# Patient Record
Sex: FEMALE | Race: OTHER | Employment: FULL TIME | URBAN - NONMETROPOLITAN AREA
[De-identification: names, ages, dates, MRNs, and addresses within clinical notes are randomized per-mention and may not be internally consistent; named-entity substitution may affect disease eponyms.]

---

## 2021-04-29 ENCOUNTER — APPOINTMENT (OUTPATIENT)
Dept: GENERAL RADIOLOGY | Age: 21
End: 2021-04-29

## 2021-04-29 ENCOUNTER — HOSPITAL ENCOUNTER (EMERGENCY)
Age: 21
Discharge: HOME | End: 2021-04-29
Attending: INTERNAL MEDICINE

## 2021-04-29 VITALS
SYSTOLIC BLOOD PRESSURE: 136 MMHG | TEMPERATURE: 98.4 F | RESPIRATION RATE: 18 BRPM | HEART RATE: 103 BPM | DIASTOLIC BLOOD PRESSURE: 91 MMHG | OXYGEN SATURATION: 100 %

## 2021-04-29 DIAGNOSIS — M54.31 SCIATICA OF RIGHT SIDE: ICD-10-CM

## 2021-04-29 DIAGNOSIS — S39.012A BACK STRAIN, INITIAL ENCOUNTER: Primary | ICD-10-CM

## 2021-04-29 PROCEDURE — 6370000000 HC RX 637 (ALT 250 FOR IP): Performed by: INTERNAL MEDICINE

## 2021-04-29 PROCEDURE — 6360000002 HC RX W HCPCS: Performed by: INTERNAL MEDICINE

## 2021-04-29 PROCEDURE — 99284 EMERGENCY DEPT VISIT MOD MDM: CPT

## 2021-04-29 PROCEDURE — 96372 THER/PROPH/DIAG INJ SC/IM: CPT

## 2021-04-29 PROCEDURE — 72110 X-RAY EXAM L-2 SPINE 4/>VWS: CPT

## 2021-04-29 RX ORDER — KETOROLAC TROMETHAMINE 10 MG/1
10 TABLET, FILM COATED ORAL EVERY 6 HOURS PRN
Qty: 20 TABLET | Refills: 0 | Status: SHIPPED | OUTPATIENT
Start: 2021-04-29 | End: 2022-02-09

## 2021-04-29 RX ORDER — KETOROLAC TROMETHAMINE 30 MG/ML
60 INJECTION, SOLUTION INTRAMUSCULAR; INTRAVENOUS ONCE
Status: COMPLETED | OUTPATIENT
Start: 2021-04-29 | End: 2021-04-29

## 2021-04-29 RX ORDER — CYCLOBENZAPRINE HCL 10 MG
10 TABLET ORAL ONCE
Status: COMPLETED | OUTPATIENT
Start: 2021-04-29 | End: 2021-04-29

## 2021-04-29 RX ORDER — PREDNISONE 20 MG/1
40 TABLET ORAL DAILY
Qty: 10 TABLET | Refills: 0 | Status: SHIPPED | OUTPATIENT
Start: 2021-04-29 | End: 2021-05-04

## 2021-04-29 RX ORDER — METHYLPREDNISOLONE SODIUM SUCCINATE 125 MG/2ML
125 INJECTION, POWDER, LYOPHILIZED, FOR SOLUTION INTRAMUSCULAR; INTRAVENOUS ONCE
Status: COMPLETED | OUTPATIENT
Start: 2021-04-29 | End: 2021-04-29

## 2021-04-29 RX ORDER — CYCLOBENZAPRINE HCL 10 MG
10 TABLET ORAL 2 TIMES DAILY PRN
Qty: 30 TABLET | Refills: 0 | Status: SHIPPED | OUTPATIENT
Start: 2021-04-29 | End: 2021-05-09

## 2021-04-29 RX ADMIN — KETOROLAC TROMETHAMINE 60 MG: 30 INJECTION, SOLUTION INTRAMUSCULAR at 12:05

## 2021-04-29 RX ADMIN — METHYLPREDNISOLONE SODIUM SUCCINATE 125 MG: 125 INJECTION, POWDER, FOR SOLUTION INTRAMUSCULAR; INTRAVENOUS at 12:05

## 2021-04-29 RX ADMIN — CYCLOBENZAPRINE HYDROCHLORIDE 10 MG: 10 TABLET, FILM COATED ORAL at 12:05

## 2021-04-29 ASSESSMENT — PAIN DESCRIPTION - ORIENTATION: ORIENTATION: RIGHT

## 2021-04-29 ASSESSMENT — PAIN DESCRIPTION - PAIN TYPE: TYPE: ACUTE PAIN

## 2021-04-29 ASSESSMENT — PAIN DESCRIPTION - LOCATION: LOCATION: BACK;LEG;HIP

## 2021-04-29 NOTE — ED NOTES
Pt refused urine drug screen and states is not going to fill out [facility] paperwork.       Vamsi Blunt RN  04/29/21 3992

## 2021-04-29 NOTE — ED NOTES
Patient calls out stating she can provide urine specimen for urine drug screen required by Lilia. After 5 minutes in the bathroom patient unable to provide urine specimen. Patient has water and is encouraged to call when she can try again.       Hetal Lindo RN  04/29/21 1257

## 2021-04-29 NOTE — ED NOTES
Stephanie Holder, Director of Nursing at [facility], was notified that this patient has refused to provide specimen for urine drug screen.       Rose Gonzalez RN  04/29/21 6930

## 2021-04-29 NOTE — ED PROVIDER NOTES
[facility] ED  EMERGENCY DEPARTMENT ENCOUNTER      Pt Name: Yoandy German  MRN: [id number]  [place] 2000  Date of evaluation: 4/29/2021  Provider: Seda Calles MD    CHIEF COMPLAINT       Chief Complaint   Patient presents with    Back Pain     Pt was walking a resident and the resident fell backwards landing on her right thigh. Pt has hx of sciatica and now has severe pain to her right lower back and down right leg. HISTORY OF PRESENT ILLNESS   (Location/Symptom, Timing/Onset, Context/Setting, Quality, Duration, Modifying Factors, Severity)  Note limiting factors. Yoandy German is a 23 y.o. 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 She did not fall back but continued to support the resident so she would not fall and felt pain in her lower back which radiates to her right gluteus and down her leg. She denies loss or bowel control. There is no weakness in her legs. However she does state that trying to elevate her leg causes pain in her low back. She has not tried thing for her symptoms. She has not been seen by any other providers for it. This patient is being evaluated during the COVID-19 pandemic. HPI    Nursing Notes were reviewed. REVIEW OF SYSTEMS    (2-9 systems for level 4, 10 or more for level 5)       REVIEW OF SYSTEMS    Constitutional: Negative for fatigue and fever. Genitourinary: Negative for difficulty urinating, dysuria, hematuria and urgency. Musculoskeletal: Positive for low back pain radiating to her right gluteus and leg, negative for arthralgias, back pain and neck pain. Neurological: Negative for loss of urine or bowel control, head injury, loss of consciousness, dizziness, speech difficulty, weakness, distal tingling/numbness and headaches.      All other systems reviewed and are negative. Except as noted above the remainder of the review of systems was reviewed and negative. PASTMEDICAL HISTORY   History reviewed. No pertinent past medical history. SURGICAL HISTORY     History reviewed. No pertinent surgical history. CURRENT MEDICATIONS       Discharge Medication List as of 4/29/2021 12:44 PM          ALLERGIES     Amoxicillin    FAMILY HISTORY     History reviewed. No pertinent family history.        SOCIAL HISTORY       Social History     Socioeconomic History    Marital status: Single     Spouse name: None    Number of children: None    Years of education: None    Highest education level: None   Occupational History    None   Social Needs    Financial resource strain: None    Food insecurity     Worry: None     Inability: None    Transportation needs     Medical: None     Non-medical: None   Tobacco Use    Smoking status: Never Smoker    Smokeless tobacco: Never Used   Substance and Sexual Activity    Alcohol use: None    Drug use: None    Sexual activity: None   Lifestyle    Physical activity     Days per week: None     Minutes per session: None    Stress: None   Relationships    Social connections     Talks on phone: None     Gets together: None     Attends [employer] service: None     Active member of club or organization: None     Attends meetings of clubs or organizations: None     Relationship status: None    Intimate partner violence     Fear of current or ex partner: None     Emotionally abused: None     Physically abused: None     Forced sexual activity: None   Other Topics Concern    None   Social History Narrative    None       SCREENINGS    [place] Coma Scale  Eye Opening: Spontaneous  Best Verbal Response: Oriented  Best Motor Response: Obeys commands  [place] Coma Scale Score: 15        PHYSICAL EXAM    (up to 7 for level 4, 8 or more for level 5)     ED Triage Vitals [04/29/21 1128]   BP Temp Temp Source Pulse Resp SpO2 Height Weight   (!) 136/91 98.4 °F (36.9 °C) Oral 103 18 100 % -- --       Physical Exam  Physical Exam   Constitutional:  Appears well, well-developed and well-nourished. No distress noted. Non toxic in appearance  HENT:     Head: Normocephalic and atraumatic. Mouth/Throat: Oropharynx is clear and mucosa moist. No oropharyngeal exudate noted. Posterior pharynx is pink and noninjected. Eyes: Conjunctivae and EOM are normal. Pupils are equal, round, and reactive to light. No scleral icterus. No tearing or drainage. Neck: Normal range of motion. Neck supple. No tracheal deviation present. No spinous tenderness or step-offs identified on palpation. There is no paraspinous tenderness. No neck pain with axial loading. Cardiovascular: Normal rate, regular rhythm, normal heart sounds and intact distal pulses. Exam reveals no gallop or friction rub. No murmur heard. Pulmonary/Chest: Effort normal and breath sounds are symmetric and normal. No respiratory distress. There are no wheezes, rales or rhonchi. No tenderness is exhibited upon palpation of the chest wall. Abdominal: Soft. Bowel sounds are normal. No distension or no mass exhibitted. There is no tenderness, rebound, rigidity or guarding. Genitourinary:   No CVA tenderness noted on examination. Musculoskeletal: No spinous tenderness or step-offs identified on palpation. There is moderate paraspinous tenderness in the right lower lumbar region extending across the right gluteus. Patient sitting in wc refusing to stand. Extending her right knee with hip flexed causes exacerbation of the gluteal pain. Bette[place] Grad Lymphadenopathy:  No cervical adenopathy. Neurological:   Alert and oriented to person, place, and time. Deep tendon reflexes are normal in the lower extremities. There are no cranial nerve deficits. Normal muscle tone, motor and sensory function exhibitted. Strength 5/5 in all extremities and torso.   Coordination normal but gait antalgic. .   Skin: Skin is warm and dry. No rash noted. No diaphoresis. No erythema. No pallor. Psychiatric:  normal mood and affect. Behavior is  normal. Judgment and thought content normal.     DIAGNOSTIC RESULTS     EKG: All EKG's are interpreted by the Emergency Department Physician who either signs or Co-signs this chart in the absence of a cardiologist.    Not indicated. RADIOLOGY:   Non-plain film images such as CT, Ultrasoundand MRI are read by the radiologist.     Interpretation per the Radiologist below, if available at the time of this note:    XR LUMBAR SPINE (MIN 4 VIEWS)   Final Result      No compression fracture, subluxation, or other acute finding in the lumbar    spine. ED BEDSIDE ULTRASOUND:   Performed by ED Physician - none    LABS:  Labs Reviewed - No data to display    All other labs were within normal range or not returned as of this dictation. EMERGENCY DEPARTMENT COURSE and DIFFERENTIAL DIAGNOSIS/MDM:   Vitals:    Vitals:    04/29/21 1128   BP: (!) 136/91   Pulse: 103   Resp: 18   Temp: 98.4 °F (36.9 °C)   TempSrc: Oral   SpO2: 100%         Noted    MDM    CRITICAL CARE TIME   Total Critical Care time was 0 minutes    EDCOURSE       CONSULTS:  None    PROCEDURES:  Unless otherwise noted below, none     Procedures      Summation      Yoly Underwood is a 23 y.o. female who has a history of low back pain and sciatica, presented with low back strain and resulting right sided sciatica due to a patient falling onto her leg which caused her to strain and hold the patient in the fall. She is well, well hydrated, nontoxic, hemodynamically stable, neurologically intact and satisfactory for discharge for outpatient management. Findings discussed at length with patient.  I discussed and recommended  ROM exercises, rx flexeril, toradol prednisone, low setting heat to warm up the muscles     I instructed the patient to followup with [facility] occupational health for evaluation of response to management of acute injury. I instructed the patient to return to the ER if his condition worsens, if there is any concern for altered mental status, difficulty breathing, dehydration or loss of function. Patient Course:        ED Medicationsadministered this visit:    Medications   ketorolac (TORADOL) injection 60 mg (60 mg Intramuscular Given 4/29/21 1205)   methylPREDNISolone sodium (SOLU-MEDROL) injection 125 mg (125 mg Intramuscular Given 4/29/21 1205)   cyclobenzaprine (FLEXERIL) tablet 10 mg (10 mg Oral Given 4/29/21 1205)       New Prescriptions from this visit:    Discharge Medication List as of 4/29/2021 12:44 PM      START taking these medications    Details   predniSONE (DELTASONE) 20 MG tablet Take 2 tablets by mouth daily for 5 days, Disp-10 tablet, R-0Normal      ketorolac (TORADOL) 10 MG tablet Take 1 tablet by mouth every 6 hours as needed for Pain, Disp-20 tablet, R-0Normal      cyclobenzaprine (FLEXERIL) 10 MG tablet Take 1 tablet by mouth 2 times daily as needed for Muscle spasms May cause drowsiness. May use at night when not operating machinery and driving., FMEZ-27 tablet, R-0Normal             Follow-up:  [facility] ED  [address] [address] [facility] [address] [phone]  Go to   As needed, If symptoms worsen    [address]  [phone]  Call in 1 day          Final Impression:   1. Back strain, initial encounter    2. Sciatica of right side               (Please note that portions of this note werecompleted with a voice recognition program.  Efforts were made to edit the dictations but occasionally words are mis-transcribed.)    FINAL IMPRESSION      1. Back strain, initial encounter    2.  Sciatica of right side          DISPOSITION/PLAN   DISPOSITION        PATIENT REFERRED TO:  [facility] ED  [address] [address] [facility] [address] [phone]  Go to   As needed, If symptoms [address]  [phone]  Call in 1 day        DISCHARGE MEDICATIONS:  Discharge Medication List as of 4/29/2021 12:44 PM      START taking these medications    Details   predniSONE (DELTASONE) 20 MG tablet Take 2 tablets by mouth daily for 5 days, Disp-10 tablet, R-0Normal      ketorolac (TORADOL) 10 MG tablet Take 1 tablet by mouth every 6 hours as needed for Pain, Disp-20 tablet, R-0Normal      cyclobenzaprine (FLEXERIL) 10 MG tablet Take 1 tablet by mouth 2 times daily as needed for Muscle spasms May cause drowsiness.   May use at night when not operating machinery and driving., AMUN-14 tablet, R-0Normal                (Please note that portions of this note were completed with a voice recognition program.  Efforts were made to edit the dictations but occasionally words are mis-transcribed.)    Al Melendrez MD (electronically signed)  Attending Emergency Physician          Al Melendrez MD  05/01/21 2036

## 2022-02-09 ENCOUNTER — HOSPITAL ENCOUNTER (OUTPATIENT)
Age: 22
Discharge: HOME | End: 2022-02-09
Payer: COMMERCIAL

## 2022-02-09 ENCOUNTER — HOSPITAL ENCOUNTER (OUTPATIENT)
Dept: GENERAL RADIOLOGY | Age: 22
Discharge: HOME | End: 2022-02-11
Payer: COMMERCIAL

## 2022-02-09 ENCOUNTER — OFFICE VISIT (OUTPATIENT)
Dept: FAMILY MEDICINE CLINIC | Age: 22
End: 2022-02-09
Payer: COMMERCIAL

## 2022-02-09 VITALS
BODY MASS INDEX: 43.68 KG/M2 | OXYGEN SATURATION: 99 % | HEART RATE: 84 BPM | SYSTOLIC BLOOD PRESSURE: 110 MMHG | WEIGHT: 262.2 LBS | RESPIRATION RATE: 20 BRPM | HEIGHT: 65 IN | DIASTOLIC BLOOD PRESSURE: 78 MMHG

## 2022-02-09 DIAGNOSIS — Z00.00 ENCOUNTER FOR MEDICAL EXAMINATION TO ESTABLISH CARE: ICD-10-CM

## 2022-02-09 DIAGNOSIS — G89.29 CHRONIC PAIN OF RIGHT KNEE: ICD-10-CM

## 2022-02-09 DIAGNOSIS — Z13.31 POSITIVE DEPRESSION SCREENING: ICD-10-CM

## 2022-02-09 DIAGNOSIS — M54.41 CHRONIC RIGHT-SIDED LOW BACK PAIN WITH RIGHT-SIDED SCIATICA: ICD-10-CM

## 2022-02-09 DIAGNOSIS — M25.561 CHRONIC PAIN OF RIGHT KNEE: ICD-10-CM

## 2022-02-09 DIAGNOSIS — G89.29 CHRONIC RIGHT-SIDED LOW BACK PAIN WITH RIGHT-SIDED SCIATICA: ICD-10-CM

## 2022-02-09 DIAGNOSIS — F31.9 BIPOLAR DEPRESSION (HCC): Primary | ICD-10-CM

## 2022-02-09 PROCEDURE — G8484 FLU IMMUNIZE NO ADMIN: HCPCS | Performed by: STUDENT IN AN ORGANIZED HEALTH CARE EDUCATION/TRAINING PROGRAM

## 2022-02-09 PROCEDURE — G8417 CALC BMI ABV UP PARAM F/U: HCPCS | Performed by: STUDENT IN AN ORGANIZED HEALTH CARE EDUCATION/TRAINING PROGRAM

## 2022-02-09 PROCEDURE — 73564 X-RAY EXAM KNEE 4 OR MORE: CPT

## 2022-02-09 PROCEDURE — 1036F TOBACCO NON-USER: CPT | Performed by: STUDENT IN AN ORGANIZED HEALTH CARE EDUCATION/TRAINING PROGRAM

## 2022-02-09 PROCEDURE — 99204 OFFICE O/P NEW MOD 45 MIN: CPT | Performed by: STUDENT IN AN ORGANIZED HEALTH CARE EDUCATION/TRAINING PROGRAM

## 2022-02-09 PROCEDURE — G8427 DOCREV CUR MEDS BY ELIG CLIN: HCPCS | Performed by: STUDENT IN AN ORGANIZED HEALTH CARE EDUCATION/TRAINING PROGRAM

## 2022-02-09 RX ORDER — VALACYCLOVIR HYDROCHLORIDE 1 G/1
TABLET, FILM COATED ORAL
COMMUNITY
Start: 2021-10-13

## 2022-02-09 RX ORDER — OMEPRAZOLE 20 MG/1
20 CAPSULE, DELAYED RELEASE ORAL DAILY
COMMUNITY

## 2022-02-09 SDOH — ECONOMIC STABILITY: FOOD INSECURITY: WITHIN THE PAST 12 MONTHS, THE FOOD YOU BOUGHT JUST DIDN'T LAST AND YOU DIDN'T HAVE MONEY TO GET MORE.: NEVER TRUE

## 2022-02-09 SDOH — ECONOMIC STABILITY: FOOD INSECURITY: WITHIN THE PAST 12 MONTHS, YOU WORRIED THAT YOUR FOOD WOULD RUN OUT BEFORE YOU GOT MONEY TO BUY MORE.: NEVER TRUE

## 2022-02-09 ASSESSMENT — PATIENT HEALTH QUESTIONNAIRE - PHQ9
3. TROUBLE FALLING OR STAYING ASLEEP: 3
2. FEELING DOWN, DEPRESSED OR HOPELESS: 1
9. THOUGHTS THAT YOU WOULD BE BETTER OFF DEAD, OR OF HURTING YOURSELF: 1
SUM OF ALL RESPONSES TO PHQ QUESTIONS 1-9: 14
SUM OF ALL RESPONSES TO PHQ QUESTIONS 1-9: 15
4. FEELING TIRED OR HAVING LITTLE ENERGY: 2
SUM OF ALL RESPONSES TO PHQ9 QUESTIONS 1 & 2: 1
7. TROUBLE CONCENTRATING ON THINGS, SUCH AS READING THE NEWSPAPER OR WATCHING TELEVISION: 2
6. FEELING BAD ABOUT YOURSELF - OR THAT YOU ARE A FAILURE OR HAVE LET YOURSELF OR YOUR FAMILY DOWN: 3
1. LITTLE INTEREST OR PLEASURE IN DOING THINGS: 0
8. MOVING OR SPEAKING SO SLOWLY THAT OTHER PEOPLE COULD HAVE NOTICED. OR THE OPPOSITE, BEING SO FIGETY OR RESTLESS THAT YOU HAVE BEEN MOVING AROUND A LOT MORE THAN USUAL: 1
5. POOR APPETITE OR OVEREATING: 2
10. IF YOU CHECKED OFF ANY PROBLEMS, HOW DIFFICULT HAVE THESE PROBLEMS MADE IT FOR YOU TO DO YOUR WORK, TAKE CARE OF THINGS AT HOME, OR GET ALONG WITH OTHER PEOPLE: 2

## 2022-02-09 ASSESSMENT — COLUMBIA-SUICIDE SEVERITY RATING SCALE - C-SSRS
4. HAVE YOU HAD THESE THOUGHTS AND HAD SOME INTENTION OF ACTING ON THEM?: NO
3. HAVE YOU BEEN THINKING ABOUT HOW YOU MIGHT KILL YOURSELF?: YES
6. HAVE YOU EVER DONE ANYTHING, STARTED TO DO ANYTHING, OR PREPARED TO DO ANYTHING TO END YOUR LIFE?: YES
1. WITHIN THE PAST MONTH, HAVE YOU WISHED YOU WERE DEAD OR WISHED YOU COULD GO TO SLEEP AND NOT WAKE UP?: YES
7. DID THIS OCCUR IN THE LAST THREE MONTHS: YES
2. HAVE YOU ACTUALLY HAD ANY THOUGHTS OF KILLING YOURSELF?: YES
5. HAVE YOU STARTED TO WORK OUT OR WORKED OUT THE DETAILS OF HOW TO KILL YOURSELF? DO YOU INTEND TO CARRY OUT THIS PLAN?: NO

## 2022-02-09 ASSESSMENT — ENCOUNTER SYMPTOMS
ABDOMINAL PAIN: 0
SINUS PAIN: 0
DIARRHEA: 0
RHINORRHEA: 0
COUGH: 0
WHEEZING: 0
NAUSEA: 0
VOMITING: 0

## 2022-02-09 ASSESSMENT — SOCIAL DETERMINANTS OF HEALTH (SDOH): HOW HARD IS IT FOR YOU TO PAY FOR THE VERY BASICS LIKE FOOD, HOUSING, MEDICAL CARE, AND HEATING?: NOT HARD AT ALL

## 2022-02-09 NOTE — PATIENT INSTRUCTIONS
Haydee,    Thank you for coming to see me today! I believe that our main problem is Bipolar depression (type 2 bipolar disorder). Here's what I would recommend we do:    Start Latuda 20 mg once every evening. Every 2 days, increase by another tablet until you're taking 3 tabs every evening    We also discussed your knee pain. Get an x ray today and come back Friday. Please review the documents I'm attaching related to what we discussed today. Please give me a call or message me on [employer] if you have any problems or questions, and I will see you at your next visit. Dr. Agusto Ramírez:    Felecia Osgood may be receiving a survey from [employer] regarding your visit today. Please complete the survey to enable us to provide the highest quality of care to you and your family. If you cannot score us a very good on any question, please call the office to discuss how we could of made your experience a very good one. Thank you. Clinical Care Team:     Dr. Roderick Huitron, JESSIE PatinoTeam:     Connie Sanchez  Patient Education        Bipolar Disorder: Care Instructions  Your Care Instructions     Bipolar disorder is an illness that causes extreme mood changes, from times of very high energy (manic episodes) to times of depression. But many people with bipolar disorder show only the symptoms of depression. These moods may cause problems with your work, school, family life, friendships, and how well you function. This disease is also called manic-depression. There is no cure for bipolar disorder, but it can be helped with medicines. Counseling may also help. It is important to take your medicines exactly as prescribed, even when you feel well. You may need lifelong treatment. Follow-up care is a key part of your treatment and safety. Be sure to make and go to all appointments, and call your doctor if you are having problems.  It's also a good idea to know your test results and keep a list of the medicines you take. How can you care for yourself at home? · Be safe with medicines. Take your medicines exactly as prescribed. Do not stop or change a medicine without talking to your doctor first. Felisha Nair and your doctor may need to try different combinations of medicines to find what works for you. · Take your medicines on schedule to keep your moods even. When you feel good, you may think that you do not need your medicines. But it is important to keep taking them. · Go to your counseling sessions. Call and talk with your counselor if you can't go to a session or if you don't think the sessions are helping. Do not just stop going. · Get at least 30 minutes of activity on most days of the week. Walking is a good choice. You also may want to do other things, such as running, swimming, or cycling. · Get enough sleep. Keep your room dark and quiet. Try to go to bed at the same time every night. · Eat a healthy diet. This includes whole grains, dairy, fruits, vegetables, and protein. Eat foods from each of these groups. · Try to lower your stress. Manage your time, build a strong support system, and lead a healthy lifestyle. To lower your stress, try physical activity, slow deep breathing, or getting a massage. · Do not use alcohol, marijuana, or illegal drugs. · Learn the early signs of your mood changes. You can then take steps to help yourself feel better. · Ask for help from friends and family when you need it. You may need help with daily chores when you are depressed. When you are manic, you may need support to control your high energy levels. What should you do if someone in your family has bipolar disorder? · Learn about the disease and signs it's getting worse. · Remind your family member you love them. · Make a plan with all family members about how to take care of your loved one when symptoms are bad.   · Remind yourself it will take time for changes to occur.  · Try not to blame yourself for the disease. · Know your legal rights and the legal rights of your family member. Support groups or counselors can help with this information. · Take care of yourself. Keep up with your interests, such as career, hobbies, and friends. Use exercise, positive self-talk, deep breathing, and other relaxing exercises to help lower your stress. · Give yourself time to grieve. You may need to deal with emotions such as anger, fear, and frustration. · If you are having a hard time with your feelings or with your relationship with your family member, talk with a counselor. When should you call for help? Call 911 anytime you think you may need emergency care. For example, call if:    · You feel like hurting yourself or someone else.     · Someone who has bipolar disorder displays dangerous behavior, and you think the person might hurt himself or herself or someone else. Call your doctor now or seek immediate medical care if:    · You hear voices.     · Someone you know has bipolar disorder and talks about suicide. Keep the numbers for these national suicide hotlines: 9-078-245-TALK (2-669-310-387.657.7166) and 6-882-BDJWWGL (4-876.637.8631). If a suicide threat seems real, with a specific plan and a way to carry it out, stay with the person, or ask someone you trust to stay with the person, until you can get help.     · Someone you know has bipolar disorder and:  ? Starts to give away possessions. ? Is using illegal drugs or drinking alcohol heavily. ? Talks or writes about death, including writing suicide notes or talking about guns, knives, or pills. ? Talks or writes about hurting someone else. ? Starts to spend a lot of time alone. ? Acts very aggressively or suddenly appears calm. ? Talks about beliefs that are not based in reality (delusions). Watch closely for changes in your health, and be sure to contact your doctor if:    · You cannot go to your counseling sessions. Where can you learn more? Go to [email or web address] and sign in to your [employer] account. Enter K052 in the [employer] box to learn more about \"Bipolar Disorder: Care Instructions. \"     If you do not have an account, please click on the \"Sign Up Now\" link. Current as of: June 16, 2021               Content Version: 13.1  © 2006-2021 [employer]wise[employer]. Care instructions adapted under license by [facility] ([facility]). If you have questions about a medical condition or this instruction, always ask your healthcare professional. [address] any warranty or liability for your use of this information.

## 2022-02-10 DIAGNOSIS — F31.9 BIPOLAR DEPRESSION (HCC): ICD-10-CM

## 2022-02-10 ASSESSMENT — ENCOUNTER SYMPTOMS: BACK PAIN: 1

## 2022-02-10 NOTE — TELEPHONE ENCOUNTER
I Accidentally set up Pharm wrong yesterday  sent medication to [facility] not [facility] ..     Pharmacy has been updated    Medication is pending for Correct pharmacy [facility]

## 2022-02-11 ENCOUNTER — OFFICE VISIT (OUTPATIENT)
Dept: FAMILY MEDICINE CLINIC | Age: 22
End: 2022-02-11
Payer: COMMERCIAL

## 2022-02-11 VITALS
BODY MASS INDEX: 44.73 KG/M2 | RESPIRATION RATE: 20 BRPM | HEIGHT: 64 IN | SYSTOLIC BLOOD PRESSURE: 114 MMHG | DIASTOLIC BLOOD PRESSURE: 82 MMHG | WEIGHT: 262 LBS

## 2022-02-11 DIAGNOSIS — G89.29 CHRONIC PAIN OF RIGHT KNEE: ICD-10-CM

## 2022-02-11 DIAGNOSIS — M54.41 CHRONIC RIGHT-SIDED LOW BACK PAIN WITH RIGHT-SIDED SCIATICA: Primary | ICD-10-CM

## 2022-02-11 DIAGNOSIS — M25.561 CHRONIC PAIN OF RIGHT KNEE: ICD-10-CM

## 2022-02-11 DIAGNOSIS — F31.9 BIPOLAR DEPRESSION (HCC): ICD-10-CM

## 2022-02-11 DIAGNOSIS — G89.29 CHRONIC RIGHT-SIDED LOW BACK PAIN WITH RIGHT-SIDED SCIATICA: Primary | ICD-10-CM

## 2022-02-11 PROCEDURE — 99214 OFFICE O/P EST MOD 30 MIN: CPT | Performed by: STUDENT IN AN ORGANIZED HEALTH CARE EDUCATION/TRAINING PROGRAM

## 2022-02-11 PROCEDURE — 1036F TOBACCO NON-USER: CPT | Performed by: STUDENT IN AN ORGANIZED HEALTH CARE EDUCATION/TRAINING PROGRAM

## 2022-02-11 PROCEDURE — G8484 FLU IMMUNIZE NO ADMIN: HCPCS | Performed by: STUDENT IN AN ORGANIZED HEALTH CARE EDUCATION/TRAINING PROGRAM

## 2022-02-11 PROCEDURE — G8427 DOCREV CUR MEDS BY ELIG CLIN: HCPCS | Performed by: STUDENT IN AN ORGANIZED HEALTH CARE EDUCATION/TRAINING PROGRAM

## 2022-02-11 PROCEDURE — G8417 CALC BMI ABV UP PARAM F/U: HCPCS | Performed by: STUDENT IN AN ORGANIZED HEALTH CARE EDUCATION/TRAINING PROGRAM

## 2022-02-11 RX ORDER — INDOMETHACIN 50 MG/1
50 CAPSULE ORAL 3 TIMES DAILY
Qty: 60 CAPSULE | Refills: 3 | Status: SHIPPED | OUTPATIENT
Start: 2022-02-11

## 2022-02-11 ASSESSMENT — ENCOUNTER SYMPTOMS
DIARRHEA: 0
VOMITING: 0
BACK PAIN: 1
WHEEZING: 0
NAUSEA: 0
ABDOMINAL PAIN: 0
SINUS PAIN: 0
RHINORRHEA: 0
COUGH: 0

## 2022-02-11 NOTE — PROGRESS NOTES
HPI Notes    Name: Sadaf Barrientos  : 2000         Chief Complaint:     Chief Complaint   Patient presents with    Back Pain     follow up on knee xray and rt sided back pain, pain goes down to her hip and to her knee never goes passed the knee        History of Present Illness:      HPI    This is a 57-year-old woman presenting for a visit to have a further discussion of chronic musculoskeletal pain including right-sided low back pain with right-sided sciatica, as well as chronic knee pain. Chronic knee pain: I had obtained plain film x-ray prior to this outpatient visit; this was significant for mild narrowing of the medial joint surface, consistent with early osteoarthritis. It was not noted to be severe. She had been using PRN ibuprofen and acetaminophen (200 mg ibuprofen once a day to every 4-6 hours PRN). Chronic back pain: this is an old work related injury (did not file workers compensation), which is bothersome right sided lumbago with sciatica radiating through the buttock to the level of the knee (lateral and posterior). Up until this point, she has been using ibuprofen, aleve, tylenol, heating pads, lidocaine patches, muscle rubs, all without appreciable improvement. She has also used muscle relaxers, which were overly sedating without noticeable improvement. She has been prescribed prednisone bursts, which she has never finished d/t excess agitation. 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 This XR was overall negative for fractures, subluxations, but after reviewing it personally, may have a slightly narrowed disc space for L4 on L5.      Past Medical History:     Past Medical History:   Diagnosis Date    Anxiety     Bipolar disorder ([facility] [address]     Depression     OCD (obsessive compulsive disorder)     Osteoarthritis     Personality change       Reviewed all health maintenance requirements and ordered appropriate tests  Health Maintenance Due   Topic Date Due    Hepatitis C screen  Never done    HIV screen  Never done    Chlamydia screen  Never done    Flu vaccine (1) 09/01/2021       Past Surgical History:     Past Surgical History:   Procedure Laterality Date    TONSILLECTOMY          Medications:       Prior to Admission medications    Medication Sig Start Date End Date Taking? Authorizing Provider   indomethacin (INDOCIN) 50 MG capsule Take 1 capsule by mouth 3 times daily 2/11/22  Yes Nadya Villegas DO   Cetirizine HCl 10 MG CAPS Take 10 mg by mouth as needed   Yes Historical Provider, MD   levonorgestrel (MIRENA, 52 MG,) IUD 52 mg by IntraUTERine route   Yes Historical Provider, MD   omeprazole (PRILOSEC) 20 MG delayed release capsule Take 20 mg by mouth daily   Yes Historical Provider, MD   valACYclovir (VALTREX) 1 g tablet Take 1 tablet daily for 7 days as needed for flare-ups . 10/13/21  Yes Historical Provider, MD   lurasidone (LATUDA) 20 MG TABS tablet Take one tablet by mouth at bedtime. May increase by one tablet every 2 days to max dose of 3 tablets per evening. Patient not taking: Reported on 2/11/2022 2/10/22   Nadya Villegas DO        Allergies:       Grass pollen([id number]-o-[id number]-t-swt humaira) and Amoxicillin    Social History:     Tobacco:    reports that she has never smoked. She has never used smokeless tobacco.  Alcohol:      reports previous alcohol use. Drug Use:  reports previous drug use. Family History:     Family History   Problem Relation Age of Onset    Diabetes Mother    Orozco Migraines Mother     No Known Problems Father        Review of Systems:       Review of Systems   Constitutional: Negative for fever. HENT: Negative for rhinorrhea, sinus pain and sneezing. Respiratory: Negative for cough and wheezing. Cardiovascular: Negative for chest pain. Gastrointestinal: Negative for abdominal pain, diarrhea, nausea and vomiting.    Musculoskeletal: Positive for arthralgias and back pain. Skin: Negative for rash. Neurological: Negative for headaches. Psychiatric/Behavioral: Negative for sleep disturbance. Physical Exam:     Vitals:  /82 (Site: Right Upper Arm, Position: Sitting, Cuff Size: Large Adult)   Resp 20   Ht 5' 4\" (1.626 m)   Wt 262 lb (118.8 kg)   BMI 44.97 kg/m²       Physical Exam  Vitals and nursing note reviewed. Constitutional:       General: She is not in acute distress. Appearance: Normal appearance. She is obese. Musculoskeletal:         General: Tenderness present. Comments: Positive straight leg raise test on the right. Tender to palpation over the right sacral base with reproduction of RLE radicular Sx   Skin:     General: Skin is warm and dry. Capillary Refill: Capillary refill takes less than 2 seconds. Neurological:      General: No focal deficit present. Mental Status: She is alert and oriented to person, place, and time. Mental status is at baseline. Cranial Nerves: No cranial nerve deficit. Motor: No weakness. Gait: Gait normal.   Psychiatric:         Mood and Affect: Mood normal.         Behavior: Behavior normal.         Thought Content: Thought content normal.             Data:     No results found for: NA, K, CL, CO2, BUN, CREATININE, GLUCOSE, PROT, LABALBU, BILITOT, ALKPHOS, AST, ALT  No results found for: WBC, RBC, HGB, HCT, MCV, MCH, MCHC, RDW, PLT, MPV  No results found for: TSH  No results found for: CHOL, LDL, HDL, PSA, LABA1C       Assessment & Plan        Diagnosis Orders   1. Chronic right-sided low back pain with right-sided sciatica  [facility] [place]    indomethacin (INDOCIN) 50 MG capsule   2. Chronic pain of right knee     3. Bipolar depression [facility])  External Referral To Counseling Services     1.   Based on history, physical examination particularly of the positive right-sided straight leg raise test, I believe that she likely has a mild disc herniation affecting the L5 nerve root, which is consistent with her described symptoms. I would recommend him indomethacin 50 mg 3 times daily as needed for this problem, and referral to physical therapy. Follow-up in 6 weeks. 2.  Mild arthritis, indomethacin will help. We also discussed long-term management may include intra-articular glucocorticoid injections. 3.  She is requesting referral to [facility] group solely for counseling, I will continue pharmacotherapy. Referral has been placed. Follow up in six weeks for this problem. Completed Refills   Requested Prescriptions     Signed Prescriptions Disp Refills    indomethacin (INDOCIN) 50 MG capsule 60 capsule 3     Sig: Take 1 capsule by mouth 3 times daily     Return in about 6 weeks (around 3/25/2022) for Right back discomfort. Orders Placed This Encounter   Medications    indomethacin (INDOCIN) 50 MG capsule     Sig: Take 1 capsule by mouth 3 times daily     Dispense:  60 capsule     Refill:  3     Orders Placed This Encounter   Procedures    [facility]     Referral Priority:   Routine     Referral Type:   Eval and Treat     Referral Reason:   Specialty Services Required     Requested Specialty:   Physical Therapy     Number of Visits Requested:   1    External Referral To Counseling Services     Referral Priority:   Routine     Referral Type:   Eval and Treat     Referral Reason:   Specialty Services Required     Requested Specialty:   Clinical Counselor     Number of Visits Requested:   1         Patient Instructions   Haydee,    Thank you for coming to see me today! I believe that our main problem is your low back pain. Here's what I would recommend we do:    I'm concerned it's due to a herniated (slipped) disc  Take indomethacin up to three times a day for this problem  I've also referred you to PT for this problem    Come back in 6 weeks    We also discussed your right knee pain.     It's from wear and tear arthritis. Whenever it flares badly, use ibuprofen 600 mg three times a day every day for 7 days then stop  Call me if you'd like to consider a knee injection with a steroid medicine. You may also consider a medial knee unloading brace. Please review the documents I'm attaching related to what we discussed today. Please give me a call or message me on [employer] if you have any problems or questions, and I will see you at your next visit. Dr. Sage Hendricks:    Michael Newton may be receiving a survey from [employer] regarding your visit today. Please complete the survey to enable us to provide the highest quality of care to you and your family. If you cannot score us a very good on any question, please call the office to discuss how we could of made your experience a very good one. Thank you. Clinical Care Team:     Dr. Sae Martinez, [facility]      ClericalTeam:     [id number] [facility]      Electronically signed by Jimenez Robles DO on 2/11/2022 at 8:52 AM           Completed Refills   Requested Prescriptions     Signed Prescriptions Disp Refills    indomethacin (INDOCIN) 50 MG capsule 60 capsule 3     Sig: Take 1 capsule by mouth 3 times daily         Haydee received counseling on the following healthy behaviors: nutrition, exercise and medication adherence  Reviewed prior labs and health maintenance. Continue current medications, diet and exercise. Discussed use, benefit, and side effects of prescribed medications. Barriers to medication compliance addressed. Patient given educational materials - see patient instructions. All patient questions answered. Patient voiced understanding.

## 2022-02-11 NOTE — TELEPHONE ENCOUNTER
Still Have not heard if her [place] has been approved or denied. Called [facility] they said its still not going through that we can send ,60 tablets for 30 days but she will run out of medication at day 20.  If we do that just  Send in a new RX so we don't cancel the old rx due to still trying to get Rx approved

## 2022-02-11 NOTE — PATIENT INSTRUCTIONS
Haydee,    Thank you for coming to see me today! I believe that our main problem is your low back pain. Here's what I would recommend we do:    I'm concerned it's due to a herniated (slipped) disc  Take indomethacin up to three times a day for this problem  I've also referred you to PT for this problem    Come back in 6 weeks    We also discussed your right knee pain. It's from wear and tear arthritis. Whenever it flares badly, use ibuprofen 600 mg three times a day every day for 7 days then stop  Call me if you'd like to consider a knee injection with a steroid medicine. You may also consider a medial knee unloading brace. Please review the documents I'm attaching related to what we discussed today. Please give me a call or message me on [employer] if you have any problems or questions, and I will see you at your next visit. Dr. Agnieszka Malave:    Sheryle Pao may be receiving a survey from [employer] regarding your visit today. Please complete the survey to enable us to provide the highest quality of care to you and your family. If you cannot score us a very good on any question, please call the office to discuss how we could of made your experience a very good one. Thank you.       Clinical Care Team:     JESSIE Rios      ClericalAzul:     Marlin Llanes

## 2022-02-16 ENCOUNTER — TELEPHONE (OUTPATIENT)
Dept: FAMILY MEDICINE CLINIC | Age: 22
End: 2022-02-16

## 2022-02-16 NOTE — TELEPHONE ENCOUNTER
Pt stated she was taking medication with a cracker, she stated that she will take the medication with her dinner then let the office know if she is still having issues.

## 2022-02-16 NOTE — TELEPHONE ENCOUNTER
Patient states she started Lutuda 20 mg and is completely miserable. She states she is very nauseous, she has diarrhea and not sleeping. She would like to know if there is something different she can take? Please advise.     Health Maintenance   Topic Date Due    Hepatitis C screen  Never done    HIV screen  Never done    Chlamydia screen  Never done    Flu vaccine (1) 09/01/2021    COVID-19 Vaccine (3 - Booster for Moderna series) 04/04/2022    Depression Monitoring  02/09/2023    Pap smear  10/20/2024    DTaP/Tdap/Td vaccine (5 - Td or Tdap) 09/11/2027    Hepatitis B vaccine  Completed    Hib vaccine  Completed    HPV vaccine  Completed    Varicella vaccine  Completed    Meningococcal (ACWY) vaccine  Completed    Hepatitis A vaccine  Aged Out    Pneumococcal 0-64 years Vaccine  Aged Out             (applicable per patient's age: Cancer Screenings, Depression Screening, Fall Risk Screening, Immunizations)    No results found for: LABA1C, LABMICR, LDLCHOLESTEROL, LDLCALC, AST, ALT, BUN   (goal A1C is < 7)   (goal LDL is <100) need 30-50% reduction from baseline     BP Readings from Last 3 Encounters:   02/11/22 114/82   02/09/22 110/78   04/29/21 (!) 136/91    (goal /80)      All Future Testing planned in [facility]:      Next Visit Date:  Future Appointments   Date Time Provider Dandy Abrams   2/24/2022  9:30 AM Rosemarie Montalvo, PT [id number] [facility]   3/25/2022  8:00 AM DO Nils Law [facility]            Patient Active Problem List:     Chronic right-sided low back pain with right-sided sciatica     Chronic pain of right knee     Bipolar depression ([facility] [address]

## 2022-03-14 RX ORDER — ONDANSETRON 4 MG/1
4 TABLET, FILM COATED ORAL 3 TIMES DAILY PRN
Qty: 90 TABLET | Refills: 3 | Status: SHIPPED | OUTPATIENT
Start: 2022-03-14

## 2022-03-14 NOTE — TELEPHONE ENCOUNTER
Pt is now taking 60 mg of Latuda   States her stomach pain and feeling nauseous  has not gotten better after taking medication at bed time  would like zofran.  Sent to [facility]

## 2022-03-14 NOTE — TELEPHONE ENCOUNTER
----- Message from Albertina Odell sent at 3/14/2022 12:43 PM EDT -----  Subject: Medication Problem    QUESTIONS  Name of Medication? lurasidone (LATUDA) 20 MG TABS tablet  Patient-reported dosage and instructions? Take one tablet by mouth at   bedtime. What question or problem do you have with the medication? Patient is been   having stomach issues when taking medication wanting to know if she can   get some nausea medication to go with this medication. Preferred Pharmacy? [id number] Lemuel Spence, [address] [place] [address]  Pharmacy phone number (if available)? [phone]  Additional Information for Provider?   ---------------------------------------------------------------------------  --------------  [address]  What is the best way for the office to contact you? OK to leave message on   voicemail  Preferred Call Back Phone Number? [phone]  ---------------------------------------------------------------------------  --------------  SCRIPT ANSWERS  Relationship to Patient?  Self

## 2024-08-30 NOTE — TELEPHONE ENCOUNTER
Please contact Haydee and inquire how long she has been using Jen [place]. Also ask if she is taking it with food. If she is not, I would recommend she switch to an evening dose with a meal containing at least 350 kcal.  That often makes it significantly easier on the stomach. If she is if she is already using these methods, we can try combination therapy with Zyprexa and Prozac. - - -